# Patient Record
Sex: FEMALE | Race: WHITE | NOT HISPANIC OR LATINO | URBAN - METROPOLITAN AREA
[De-identification: names, ages, dates, MRNs, and addresses within clinical notes are randomized per-mention and may not be internally consistent; named-entity substitution may affect disease eponyms.]

---

## 2018-04-12 ENCOUNTER — EMERGENCY (EMERGENCY)
Facility: HOSPITAL | Age: 33
LOS: 0 days | Discharge: HOME | End: 2018-04-12

## 2018-04-12 VITALS
TEMPERATURE: 97 F | HEART RATE: 75 BPM | SYSTOLIC BLOOD PRESSURE: 116 MMHG | DIASTOLIC BLOOD PRESSURE: 64 MMHG | RESPIRATION RATE: 20 BRPM | OXYGEN SATURATION: 100 %

## 2018-04-12 DIAGNOSIS — Z79.899 OTHER LONG TERM (CURRENT) DRUG THERAPY: ICD-10-CM

## 2018-04-12 DIAGNOSIS — S00.81XA ABRASION OF OTHER PART OF HEAD, INITIAL ENCOUNTER: ICD-10-CM
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
## 2018-04-12 DIAGNOSIS — Z52.819 EGG (OOCYTE) DONOR, UNSPECIFIED: Chronic | ICD-10-CM
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
## 2018-04-12 DIAGNOSIS — H57.11 OCULAR PAIN, RIGHT EYE: ICD-10-CM

## 2018-04-12 DIAGNOSIS — H11.31 CONJUNCTIVAL HEMORRHAGE, RIGHT EYE: ICD-10-CM
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
## 2018-04-12 DIAGNOSIS — S00.11XA CONTUSION OF RIGHT EYELID AND PERIOCULAR AREA, INITIAL ENCOUNTER: ICD-10-CM

## 2018-04-12 RX ORDER — GANIRELIX ACETATE 250 UG/.5ML
0 INJECTION, SOLUTION SUBCUTANEOUS
Qty: 0 | Refills: 0 | COMMUNITY

## 2018-04-12 RX ORDER — MENOTROPINS 75 UNIT
0 AMPUL (EA) INTRAMUSCULAR
Qty: 0 | Refills: 0 | COMMUNITY

## 2018-04-12 NOTE — ED PROVIDER NOTE - PROGRESS NOTE DETAILS
Pt extensively counseled - warning si/sxs d/w pt. Case d/w pt at length including risks vs benefits of different management/treatment options. Pt instructed to return to ed for re-evaluation or f/u with PCP should sxs persist. Pt verbalizes an understanding & agreement with the plan as discussed

## 2018-04-12 NOTE — ED PROVIDER NOTE - CHPI ED SYMPTOMS NEG
no numbness/no weakness/no vomiting/no tingling/no deformity/no confusion/no fever/no loss of consciousness
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
## 2018-04-12 NOTE — ED PROVIDER NOTE - CARE PLAN
Principal Discharge DX:	Subconjunctival hemorrhage of right eye Principal Discharge DX:	Subconjunctival hemorrhage of right eye  Secondary Diagnosis:	Abrasion  Secondary Diagnosis:	Contusion

## 2018-04-12 NOTE — ED ADULT TRIAGE NOTE - CHIEF COMPLAINT QUOTE
right eye swelling and bruising s/p mechanical fall yesterday no loc patient given tetanus shot and iv abx by pmd. concerned about swelling today

## 2018-04-12 NOTE — ED PROVIDER NOTE - OBJECTIVE STATEMENT
eye bruising and "blood in eye" s/p trip/fall yesterday AM  +abrasion to right cheek and above eyebrow, seen at [facility] yesterday and given tetanus shot and abx to prevent infection  no lac, no loc, no head injury etc  presents today to ER because concerned about blood in eye

## 2018-04-12 NOTE — ED PROVIDER NOTE - SKIN, MLM
abrasions to right cheek and above eyebrow, ecchymosis to same area; otherwise Skin normal color for race, warm, dry and intact. No evidence of rash.

## 2018-04-12 NOTE — ED ADULT NURSE NOTE - CHPI ED SYMPTOMS NEG
no numbness/no vomiting/no tingling/no confusion/no deformity/no weakness/no bleeding/no fever/no loss of consciousness

## 2019-09-23 NOTE — ED PROVIDER NOTE - NS_EDPROVIDERDISPOUSERTYPE_ED_A_ED
Mom was seen by birth registry and paperwork collected   I have personally evaluated and examined the patient. The Attending was available to me as a supervising provider if needed.

## 2022-04-05 ENCOUNTER — OUTPATIENT (OUTPATIENT)
Dept: OUTPATIENT SERVICES | Facility: HOSPITAL | Age: 37
LOS: 1 days | Discharge: HOME | End: 2022-04-05
Payer: COMMERCIAL

## 2022-04-05 DIAGNOSIS — Z12.31 ENCOUNTER FOR SCREENING MAMMOGRAM FOR MALIGNANT NEOPLASM OF BREAST: ICD-10-CM

## 2022-04-05 DIAGNOSIS — Z52.819 EGG (OOCYTE) DONOR, UNSPECIFIED: Chronic | ICD-10-CM

## 2022-04-05 PROCEDURE — 77067 SCR MAMMO BI INCL CAD: CPT | Mod: 26

## 2022-04-05 PROCEDURE — 77063 BREAST TOMOSYNTHESIS BI: CPT | Mod: 26

## 2022-04-14 ENCOUNTER — OUTPATIENT (OUTPATIENT)
Dept: OUTPATIENT SERVICES | Facility: HOSPITAL | Age: 37
LOS: 1 days | Discharge: HOME | End: 2022-04-14
Payer: COMMERCIAL

## 2022-04-14 DIAGNOSIS — Z52.819 EGG (OOCYTE) DONOR, UNSPECIFIED: Chronic | ICD-10-CM

## 2022-04-14 DIAGNOSIS — R92.8 OTHER ABNORMAL AND INCONCLUSIVE FINDINGS ON DIAGNOSTIC IMAGING OF BREAST: ICD-10-CM

## 2022-04-14 PROCEDURE — 77065 DX MAMMO INCL CAD UNI: CPT | Mod: 26,RT

## 2022-04-14 PROCEDURE — 76641 ULTRASOUND BREAST COMPLETE: CPT | Mod: 26,50

## 2022-04-14 PROCEDURE — G0279: CPT | Mod: 26
